# Patient Record
Sex: FEMALE | Race: OTHER | URBAN - METROPOLITAN AREA
[De-identification: names, ages, dates, MRNs, and addresses within clinical notes are randomized per-mention and may not be internally consistent; named-entity substitution may affect disease eponyms.]

---

## 2021-03-13 ENCOUNTER — EMERGENCY (EMERGENCY)
Facility: HOSPITAL | Age: 21
LOS: 0 days | Discharge: HOME | End: 2021-03-14
Attending: EMERGENCY MEDICINE | Admitting: EMERGENCY MEDICINE
Payer: SELF-PAY

## 2021-03-13 VITALS
OXYGEN SATURATION: 99 % | WEIGHT: 149.91 LBS | DIASTOLIC BLOOD PRESSURE: 71 MMHG | HEIGHT: 58 IN | HEART RATE: 89 BPM | RESPIRATION RATE: 18 BRPM | TEMPERATURE: 99 F | SYSTOLIC BLOOD PRESSURE: 123 MMHG

## 2021-03-13 DIAGNOSIS — R10.9 UNSPECIFIED ABDOMINAL PAIN: ICD-10-CM

## 2021-03-13 DIAGNOSIS — N89.8 OTHER SPECIFIED NONINFLAMMATORY DISORDERS OF VAGINA: ICD-10-CM

## 2021-03-13 PROCEDURE — 99284 EMERGENCY DEPT VISIT MOD MDM: CPT

## 2021-03-13 PROCEDURE — 99053 MED SERV 10PM-8AM 24 HR FAC: CPT

## 2021-03-14 LAB
ALBUMIN SERPL ELPH-MCNC: 4.4 G/DL — SIGNIFICANT CHANGE UP (ref 3.5–5.2)
ALP SERPL-CCNC: 95 U/L — SIGNIFICANT CHANGE UP (ref 30–115)
ALT FLD-CCNC: 45 U/L — HIGH (ref 0–41)
ANION GAP SERPL CALC-SCNC: 12 MMOL/L — SIGNIFICANT CHANGE UP (ref 7–14)
APPEARANCE UR: CLEAR — SIGNIFICANT CHANGE UP
AST SERPL-CCNC: 33 U/L — SIGNIFICANT CHANGE UP (ref 0–41)
BASOPHILS # BLD AUTO: 0.04 K/UL — SIGNIFICANT CHANGE UP (ref 0–0.2)
BASOPHILS NFR BLD AUTO: 0.5 % — SIGNIFICANT CHANGE UP (ref 0–1)
BILIRUB SERPL-MCNC: <0.2 MG/DL — SIGNIFICANT CHANGE UP (ref 0.2–1.2)
BILIRUB UR-MCNC: NEGATIVE — SIGNIFICANT CHANGE UP
BUN SERPL-MCNC: 17 MG/DL — SIGNIFICANT CHANGE UP (ref 10–20)
CALCIUM SERPL-MCNC: 9 MG/DL — SIGNIFICANT CHANGE UP (ref 8.5–10.1)
CHLORIDE SERPL-SCNC: 104 MMOL/L — SIGNIFICANT CHANGE UP (ref 98–110)
CO2 SERPL-SCNC: 20 MMOL/L — SIGNIFICANT CHANGE UP (ref 17–32)
COLOR SPEC: SIGNIFICANT CHANGE UP
CREAT SERPL-MCNC: 0.6 MG/DL — LOW (ref 0.7–1.5)
DIFF PNL FLD: NEGATIVE — SIGNIFICANT CHANGE UP
EOSINOPHIL # BLD AUTO: 0.07 K/UL — SIGNIFICANT CHANGE UP (ref 0–0.7)
EOSINOPHIL NFR BLD AUTO: 0.8 % — SIGNIFICANT CHANGE UP (ref 0–8)
GLUCOSE SERPL-MCNC: 127 MG/DL — HIGH (ref 70–99)
GLUCOSE UR QL: NEGATIVE — SIGNIFICANT CHANGE UP
HCT VFR BLD CALC: 36 % — LOW (ref 37–47)
HGB BLD-MCNC: 11.5 G/DL — LOW (ref 12–16)
IMM GRANULOCYTES NFR BLD AUTO: 0.2 % — SIGNIFICANT CHANGE UP (ref 0.1–0.3)
KETONES UR-MCNC: NEGATIVE — SIGNIFICANT CHANGE UP
LACTATE SERPL-SCNC: 1.1 MMOL/L — SIGNIFICANT CHANGE UP (ref 0.7–2)
LEUKOCYTE ESTERASE UR-ACNC: NEGATIVE — SIGNIFICANT CHANGE UP
LIDOCAIN IGE QN: 24 U/L — SIGNIFICANT CHANGE UP (ref 7–60)
LYMPHOCYTES # BLD AUTO: 3.22 K/UL — SIGNIFICANT CHANGE UP (ref 1.2–3.4)
LYMPHOCYTES # BLD AUTO: 36.7 % — SIGNIFICANT CHANGE UP (ref 20.5–51.1)
MCHC RBC-ENTMCNC: 25.8 PG — LOW (ref 27–31)
MCHC RBC-ENTMCNC: 31.9 G/DL — LOW (ref 32–37)
MCV RBC AUTO: 80.7 FL — LOW (ref 81–99)
MONOCYTES # BLD AUTO: 0.75 K/UL — HIGH (ref 0.1–0.6)
MONOCYTES NFR BLD AUTO: 8.5 % — SIGNIFICANT CHANGE UP (ref 1.7–9.3)
NEUTROPHILS # BLD AUTO: 4.68 K/UL — SIGNIFICANT CHANGE UP (ref 1.4–6.5)
NEUTROPHILS NFR BLD AUTO: 53.3 % — SIGNIFICANT CHANGE UP (ref 42.2–75.2)
NITRITE UR-MCNC: NEGATIVE — SIGNIFICANT CHANGE UP
NRBC # BLD: 0 /100 WBCS — SIGNIFICANT CHANGE UP (ref 0–0)
PH UR: 6 — SIGNIFICANT CHANGE UP (ref 5–8)
PLATELET # BLD AUTO: 293 K/UL — SIGNIFICANT CHANGE UP (ref 130–400)
POTASSIUM SERPL-MCNC: 3.8 MMOL/L — SIGNIFICANT CHANGE UP (ref 3.5–5)
POTASSIUM SERPL-SCNC: 3.8 MMOL/L — SIGNIFICANT CHANGE UP (ref 3.5–5)
PROT SERPL-MCNC: 7.8 G/DL — SIGNIFICANT CHANGE UP (ref 6–8)
PROT UR-MCNC: NEGATIVE — SIGNIFICANT CHANGE UP
RBC # BLD: 4.46 M/UL — SIGNIFICANT CHANGE UP (ref 4.2–5.4)
RBC # FLD: 14.8 % — HIGH (ref 11.5–14.5)
SODIUM SERPL-SCNC: 136 MMOL/L — SIGNIFICANT CHANGE UP (ref 135–146)
SP GR SPEC: 1.03 — SIGNIFICANT CHANGE UP (ref 1.01–1.03)
UROBILINOGEN FLD QL: SIGNIFICANT CHANGE UP
WBC # BLD: 8.78 K/UL — SIGNIFICANT CHANGE UP (ref 4.8–10.8)
WBC # FLD AUTO: 8.78 K/UL — SIGNIFICANT CHANGE UP (ref 4.8–10.8)

## 2021-03-14 RX ORDER — CEFTRIAXONE 500 MG/1
500 INJECTION, POWDER, FOR SOLUTION INTRAMUSCULAR; INTRAVENOUS ONCE
Refills: 0 | Status: COMPLETED | OUTPATIENT
Start: 2021-03-14 | End: 2021-03-14

## 2021-03-14 RX ADMIN — CEFTRIAXONE 500 MILLIGRAM(S): 500 INJECTION, POWDER, FOR SOLUTION INTRAMUSCULAR; INTRAVENOUS at 01:49

## 2021-03-14 NOTE — ED PROVIDER NOTE - NSFOLLOWUPINSTRUCTIONS_ED_ALL_ED_FT
Secreción vaginal    LO QUE NECESITA SABER:    ¿Qué necesito saber acerca de la secreción vaginal?La secreción vaginal es normal. La secreción por lo general es transparente o radha y sin olor. La secreción vaginal es la forma en que carney cuerpo limpia la vagina para que esté saludable. La irritación, la picazón, el ardor o un cambio en la cantidad, el olor o el color puede indicar un problema.    ¿Qué causa cambios en la secreción vaginal?  •Productos químicos en las duchas vaginales      •Aerosoles de higiene femenina      •Ciertos jabones arian      •Los antibióticos      •Diabetes, embarazo o avi infección      ¿Cómo puedo mantener a mi vagina saludable?  •Límpiese siempre de adelante hacia atrásdespués de usar el inodoro. [place] n[place] la diseminación de bacterias desde el área rectal hacia la vagina.      •Limpie alrededor de carney vagina con agua tibia y un jabón suave todos los días.Después de lavada, séquela suavemente. No use jacuzzis. El calor y la humedad de los jacuzzis pueden aumentar el riesgo de otra candidiasis.      •No use ropa ni lencería apretadadurante largos períodos. Que use ropa interior de algodón sharita el día. El algodón ayuda a mantener el área genital seca y no mantiene el calor o la humedad. No use ninguna ropa interior por las noches.      •Cambie el detergente o suavizante de telassi elizabeth que irrita carney piel.      •No tome duchas vaginalesni use aerosoles de higiene femenina o ene de burbujas. No utilice almohadillas o tampones que son perfumados o de colores ni papel higiénico perfumado.      •Consulte con carney médico acerca de las opciones de anticonceptivos, si es necesario.Los condones de látex y los diafragmas tienen un gel que rogers los espermatozoides. Ambos pueden irritar el área genital.      ¿Cuándo mick comunicarme con mi médico?  •Tiene hinchazón, ardor, picazón o irritación en la vagina o alrededor de esta.      •Tiene un aumento en la cantidad de secreción vaginal.      •Se modifica el color o el olor de la secreción vaginal.      •La secreción vaginal es similar al queso cottage.      •La secreción contiene crystal y no es de la menstruación.      •Siente dolor al tener relaciones sexuales.      •Usted tiene dificultad para orinar u orina con frecuencia y urgencia.      •Usted tiene dolor o calambres abdominales.      •Usted tiene fiebre o escalofríos.      •Usted tiene dolor lumbar o lateral en la espalda.      •Usted tiene preguntas o inquietudes acerca de carney condición o cuidado.      ACUERDOS SOBRE CARNEY CUIDADO:    Usted tiene el derecho de ayudar a planear carney cuidado. Aprenda todo lo que pueda sobre carney condición y sukhjinder darle tratamiento. Discuta enrique opciones de tratamiento con enrique médicos para decidir el cuidado que usted desea recibir. Usted siempre tiene el derecho de rechazar el tratamiento.       © Copyright [employer] 2021

## 2021-03-14 NOTE — ED ADULT NURSE NOTE - OBJECTIVE STATEMENT
21yr old female presents w/ lower abd pain during urination that started 4 days after an episode of consitpation that ended 4 days ago. pt stated she experience some pain and some blood her first bowel movement since the constipation.

## 2021-03-14 NOTE — ED PROVIDER NOTE - PHYSICAL EXAMINATION
CONST: Well appearing in NAD  EYES: PERRL, EOMI, Sclera and conjunctiva clear.   NECK: Non-tender, no meningeal signs. normal ROM. supple   CARD: Normal S1 S2; Normal rate and rhythm  RESP: Equal BS B/L, No wheezes, rhonchi or rales. No distress  GI: Soft, non-tender, non-distended. no cva tenderness. normal BS  MS: Normal ROM in all extremities. No midline spinal tenderness. pulses 2 +.   SKIN: Warm, dry, no acute rashes. Good turgor  NEURO: A&Ox3, No focal deficits. CONST: Well appearing in NAD  EYES: PERRL, EOMI, Sclera and conjunctiva clear.   NECK: Non-tender, no meningeal signs. normal ROM. supple   CARD: Normal S1 S2; Normal rate and rhythm  RESP: Equal BS B/L, No wheezes, rhonchi or rales. No distress  GI: Soft, non-tender, non-distended. no cva tenderness. normal BS  GYN: small amount of discharge to cervix. no cmt or adnexal tenderness  MS: Normal ROM in all extremities. No midline spinal tenderness. pulses 2 +.   SKIN: Warm, dry, no acute rashes. Good turgor  NEURO: A&Ox3, No focal deficits.

## 2021-03-14 NOTE — ED PROVIDER NOTE - OBJECTIVE STATEMENT
21 year old female with no pmhx presents with suprapubic abd pain x few days. Pain worse with urination. Pt also admits to being constipated, and when she had a BM 4 days ago, noted small bright red blood on toilet paper. pt admits having normal BM with no blood. no nausea, vomiting, diarrhea, fever, chills, vaginal bleeding or discharge. no rectal pain.

## 2021-03-14 NOTE — ED PROVIDER NOTE - NS ED ROS FT
Review of Systems:  	•	CONSTITUTIONAL - no fever, no diaphoresis, no chills  	•	SKIN - no rash  	•	HEMATOLOGIC - no bleeding, no bruising  	•	EYES - no eye pain, no blurry vision  	•	ENT - no change in hearing, no sore throat, no ear pain or tinnitus  	•	RESPIRATORY - no shortness of breath, no cough  	•	CARDIAC - no chest pain, no palpitations  	•	GI - no nausea, no vomiting, no diarrhea  	•	GENITO-URINARY - no discharge, no dysuria; no hematuria, no increased urinary frequency  	•	MUSCULOSKELETAL - no joint paint, no swelling, no redness  	•	NEUROLOGIC - no weakness, no headache, no paresthesias, no LOC

## 2021-03-14 NOTE — ED PROVIDER NOTE - NSFOLLOWUPCLINICS_GEN_ALL_ED_FT
[facility]  OB/GYN  [address] [place] [address]  Phone: [phone]  Fax:   Follow Up Time:

## 2021-03-14 NOTE — ED PROVIDER NOTE - ATTENDING CONTRIBUTION TO CARE
Patient is c/o dysuria, vaginal discharge, also has been constipated, after straining to move bowels noted blood on the toilet tissue when wiped, denies any other form of rectal bleeding, denies f/c/n/v/cp/sob.   Vitals reviewed.   lungs: CTA, no crackles,   abd: +BS, NT, ND, soft  A/P: Dysuria/vaginal discharge,   labs, reevaluation.

## 2021-03-14 NOTE — ED PROVIDER NOTE - PATIENT PORTAL LINK FT
You can access the FollowMyHealth Patient Portal offered by [facility] by registering at the following website: [email or web address]. By joining [employer]’s FollowMyHealth portal, you will also be able to view your health information using other applications (apps) compatible with our system.

## 2021-03-15 LAB
C TRACH RRNA SPEC QL NAA+PROBE: DETECTED
CULTURE RESULTS: SIGNIFICANT CHANGE UP
N GONORRHOEA RRNA SPEC QL NAA+PROBE: SIGNIFICANT CHANGE UP
SPECIMEN SOURCE: SIGNIFICANT CHANGE UP
SPECIMEN SOURCE: SIGNIFICANT CHANGE UP